# Patient Record
Sex: MALE | URBAN - METROPOLITAN AREA
[De-identification: names, ages, dates, MRNs, and addresses within clinical notes are randomized per-mention and may not be internally consistent; named-entity substitution may affect disease eponyms.]

---

## 2022-04-21 ENCOUNTER — TELEPHONE (OUTPATIENT)
Dept: NEPHROLOGY | Facility: CLINIC | Age: 57
End: 2022-04-21

## 2022-04-21 NOTE — TELEPHONE ENCOUNTER
----- Message from Sena Anand sent at 4/21/2022  6:33 AM CDT -----  Regarding: Medical Records  Contact: Glo  Would you please call this patient.Thanks  ----- Message -----  From: [place]  Sent: 4/20/2022  11:51 AM CDT  To: [facility] Staff    Type: Needs Medical Advice    Who Called: Glo [facility]  Best Call Back Number: [phone]  Additional  Information: Calling to see if fax has been received regarding pt medical records  Please Advise- Thank you

## 2022-04-21 NOTE — TELEPHONE ENCOUNTER
Per Cindy, this will taken care of on [facility] time for medical records.     ----- Message from [place] sent at 4/20/2022 11:19 AM CDT -----  Contact: Glo  Type: Needs Medical Advice    Who Called: Fabien Cody  Best Call Back Number: [phone]  Additional  Information: Calling to see if fax has been received regarding pt medical records  Please Advise- Thank you

## 2022-04-26 ENCOUNTER — TELEPHONE (OUTPATIENT)
Dept: NEPHROLOGY | Facility: CLINIC | Age: 57
End: 2022-04-26

## 2022-04-26 NOTE — TELEPHONE ENCOUNTER
----- Message from [place]ows sent at 4/26/2022 11:10 AM CDT -----  Contact: Jorden  Type: Needs Medical Advice    Who Called: Shaquille Leonardo  Best Call Back Number: [phone]  Additional  Information: Need pt medical records from  time frame of Oct 2019-March 2021 faxed to [phone]  Please Advise- Thank you

## 2022-04-26 NOTE — TELEPHONE ENCOUNTER
----- Message from Marina Meadows sent at 4/26/2022 11:10 AM CDT -----  Contact: Jorden  Type: Needs Medical Advice    Who Called: Shaquille Leonardo  Best Call Back Number: [phone]  Additional  Information: Need pt medical records from  time frame of Oct 2019-March 2021 faxed to [phone]  Please Advise- Thank you